# Patient Record
Sex: MALE | Race: BLACK OR AFRICAN AMERICAN | Employment: FULL TIME | URBAN - METROPOLITAN AREA
[De-identification: names, ages, dates, MRNs, and addresses within clinical notes are randomized per-mention and may not be internally consistent; named-entity substitution may affect disease eponyms.]

---

## 2018-02-20 ENCOUNTER — OFFICE VISIT (OUTPATIENT)
Dept: ENT CLINIC | Age: 58
End: 2018-02-20

## 2018-02-20 VITALS — SYSTOLIC BLOOD PRESSURE: 120 MMHG | DIASTOLIC BLOOD PRESSURE: 90 MMHG

## 2018-02-20 DIAGNOSIS — H90.3 SENSORINEURAL HEARING LOSS (SNHL) OF BOTH EARS: Primary | ICD-10-CM

## 2018-02-20 DIAGNOSIS — R43.9 DISORDERS OF SMELL: ICD-10-CM

## 2018-02-20 DIAGNOSIS — J32.9 RECURRENT SINUSITIS: ICD-10-CM

## 2018-02-20 DIAGNOSIS — J30.9 ALLERGIC SINUSITIS: ICD-10-CM

## 2018-02-20 PROCEDURE — 99203 OFFICE O/P NEW LOW 30 MIN: CPT | Performed by: OTOLARYNGOLOGY

## 2018-02-20 RX ORDER — FLUTICASONE PROPIONATE 50 MCG
2 SPRAY, SUSPENSION (ML) NASAL DAILY
Qty: 1 BOTTLE | Refills: 1 | Status: SHIPPED | OUTPATIENT
Start: 2018-02-20 | End: 2020-09-22

## 2018-02-20 RX ORDER — PREDNISONE 10 MG/1
TABLET ORAL
Qty: 25 TABLET | Refills: 0 | Status: SHIPPED | OUTPATIENT
Start: 2018-02-20 | End: 2020-09-22

## 2018-02-20 ASSESSMENT — ENCOUNTER SYMPTOMS
TROUBLE SWALLOWING: 0
EYES NEGATIVE: 1
VOICE CHANGE: 0
RESPIRATORY NEGATIVE: 1
ALLERGIC/IMMUNOLOGIC NEGATIVE: 1
SINUS PRESSURE: 1
SINUS PAIN: 1
SORE THROAT: 0
RHINORRHEA: 1
FACIAL SWELLING: 0

## 2018-02-20 NOTE — PROGRESS NOTES
negative for erythema indicative of infection. There was not evidence of deviation of the septum which was not significant. There were not polyps present. .There were not other masses present. The turbinates were not enlarged beyond normal.     Eyes: Conjunctivae are normal. Pupils are equal, round, and reactive to light. Neck: Normal range of motion. Neck supple. No tracheal deviation present. No thyromegaly present. Cardiovascular: Normal rate and regular rhythm. Pulmonary/Chest: Effort normal.   Lymphadenopathy:     He has no cervical adenopathy. Neurological: He is alert and oriented to person, place, and time. Skin: Skin is warm and dry. Psychiatric: He has a normal mood and affect. His behavior is normal. Judgment and thought content normal.       Assessment:      Concern is that it may well represent allergic sinusitis with eustachian tube dysfunction on the left side and sense of smell decrease. However, further analysis should be performed. Plan: We will obtain a sinus CT scan and try him on a course of prednisone as well as Flonase nasal spray.

## 2018-10-22 ENCOUNTER — HOSPITAL ENCOUNTER (EMERGENCY)
Age: 58
Discharge: HOME | End: 2018-10-22
Attending: EMERGENCY MEDICINE
Payer: OTHER MISCELLANEOUS

## 2018-10-22 VITALS
BODY MASS INDEX: 30.43 KG/M2 | OXYGEN SATURATION: 100 % | HEART RATE: 77 BPM | TEMPERATURE: 98.5 F | RESPIRATION RATE: 12 BRPM | WEIGHT: 229.6 LBS | HEIGHT: 73 IN | SYSTOLIC BLOOD PRESSURE: 160 MMHG | DIASTOLIC BLOOD PRESSURE: 101 MMHG

## 2018-10-22 DIAGNOSIS — I10 ELEVATED BLOOD PRESSURE READING WITH DIAGNOSIS OF HYPERTENSION: ICD-10-CM

## 2018-10-22 DIAGNOSIS — M54.31 SCIATICA OF RIGHT SIDE: Primary | ICD-10-CM

## 2018-10-22 PROCEDURE — 99283 EMERGENCY DEPT VISIT LOW MDM: CPT

## 2018-10-22 ASSESSMENT — PAIN SCALES - GENERAL
PAINLEVEL_OUTOF10: 4
PAINLEVEL_OUTOF10: 3

## 2018-10-22 ASSESSMENT — PAIN DESCRIPTION - LOCATION
LOCATION: BACK
LOCATION: LEG;BACK

## 2018-10-22 ASSESSMENT — PAIN DESCRIPTION - PAIN TYPE
TYPE: ACUTE PAIN
TYPE: ACUTE PAIN

## 2018-10-22 ASSESSMENT — PAIN DESCRIPTION - DESCRIPTORS
DESCRIPTORS: ACHING
DESCRIPTORS: ACHING

## 2018-10-22 ASSESSMENT — PAIN DESCRIPTION - ORIENTATION
ORIENTATION: RIGHT
ORIENTATION: RIGHT
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
## 2020-09-22 RX ORDER — DUPILUMAB 200 MG/1.14ML
200 INJECTION, SOLUTION SUBCUTANEOUS ONCE
COMMUNITY

## 2020-09-22 RX ORDER — AMLODIPINE BESYLATE 10 MG/1
10 TABLET ORAL DAILY
COMMUNITY

## 2020-09-22 NOTE — PROGRESS NOTES
[address] [facility] time__1230__________        Surgery time___1400_________    Take the following medications with a sip of water: Amlodipine     Do not eat or drink anything after 12:00 midnight prior to your surgery. This includes water chewing gum, mints and ice chips. You may brush your teeth and gargle the morning of your surgery, but do not swallow the water     Please see your family doctor/pediatrician for a history and physical and/or concerning medications. H&P 9/8 Juana Coulter    Bring any test results/reports from your physicians office. If you are under the care of a heart doctor or specialist doctor, please be aware that you may be asked to them for clearance    You may be asked to stop blood thinners such as Coumadin, Plavix, Fragmin, Lovenox, etc., or any anti-inflammatories such as:  Aspirin, Ibuprofen, Advil, Naproxen prior to your surgery. We also ask that you stop any OTC medications such as fish oil, vitamin E, glucosamine, garlic, Multivitamins, COQ 10, etc.    We ask that you do not smoke 24 hours prior to surgery  We ask that you do not  drink any alcoholic beverages 24 hours prior to surgery     You must make arrangements for a responsible adult to take you home after your surgery. For your safety you will not be allowed to leave alone or drive yourself home. Your surgery will be cancelled if you do not have a ride home. Also for your safety, it is strongly suggested that someone stay with you the first 24 hours after your surgery. A parent or legal guardian must accompany a child scheduled for surgery and plan to stay at the hospital until the child is discharged. Please do not bring other children with you. For your comfort, please wear simple loose fitting clothing to the hospital.  Please do not bring valuables. Wear short sleeve button down shirt or loose fitting shirt. Bting eye drops or ointment.     Do not wear any make-up or nail polish on your fingers or toes      For your safety, please do not wear any jewelry or body piercing's on the day of surgery. All jewelry must be removed. If you have dentures, they will be removed before going to operating room. For your convenience, we will provide you with a container. If you wear contact lenses or glasses, they will be removed, please bring a case for them. If you have a living will and a durable power of  for healthcare, please bring in a copy. As part of our patient safety program to minimize surgical site infections, we ask you to do the following:    · Please notify your surgeon if you develop any illness between         now and the  day of your surgery. · This includes a cough, cold, fever, sore throat, nausea,         or vomiting, and diarrhea, etc.  ·  Please notify your surgeon if you experience dizziness, shortness         of breath or blurred vision between now and the time of your surgery. Do not shave your operative site 96 hours prior to surgery. For face and neck surgery, men may use an electric razor 48 hours   prior to surgery. You may shower the night before surgery or the morning of   your surgery with an antibacterial soap. You will need to bring a photo ID and insurance card    [facility] has an onsite pharmacy, would you like to utilize our pharmacy     If you will be staying overnight and use a C-pap machine, please bring   your C-pap to hospital     Our goal is to provide you with excellent care, therefore, visitors will be limited to two(2) in the room at a time so that we may focus on providing this care for you. Please contact pre-admission testing if you have any further questions.                  [facility] phone number:  [phone]   number:  [phone]  Please note these are generalized instructions for all surgical cases, you may be provided with more specific instructions according to your surgery.

## 2020-09-22 NOTE — PROGRESS NOTES
Preoperative Screening for Elective Surgery/Invasive Procedures While COVID-19 present in the community     Have you tested positive or have been told to self-isolate for COVID-19 like symptoms within the past 28 days?  Do you currently have any of the following symptoms? o Fever >100.0 F or 99.9 F in immunocompromised patients? o New onset cough, shortness of breath or difficulty breathing?  o New onset sore throat, myalgia (muscle aches and pains), headache, loss of taste/smell or diarrhea?  Have you had a potential exposure to COVID-19 within the past 14 days by:  o Close contact with a confirmed case? o Close contact with a healthcare worker,  or essential infrastructure worker (grocery store, [employer], gas station, public utilities or transportation)? o Do you reside in a congregate setting such as; skilled nursing facility, adult home, correctional facility, homeless shelter or other institutional setting?  o Have you had recent travel to a known COVID-19 hotspot? No to all above     Indicate if the patient has a positive screen by answering yes to one or more of the above questions. Patients who test positive or screen positive prior to surgery or on the day of surgery should be evaluated in conjunction with the surgeon/proceduralist/anesthesiologist to determine the urgency of the procedure.

## 2020-09-25 ENCOUNTER — ANESTHESIA EVENT (OUTPATIENT)
Dept: SURGERY | Age: 60
End: 2020-09-25
Payer: COMMERCIAL

## 2020-09-25 NOTE — PROGRESS NOTES
[address] [facility]  [phone]        Pre-Op Phone Call:     Patient Name: Josep Rascon     Telephone Information:   Mobile [phone]     Home phone:  [phone]    Surgery Time:    2:00 PM     Arrival Time:  1230     Left extended Message:  NA     Message left with:     Recent change in health status:  No     Advised of transportation/ policy:  Yes     NPO policy reviewed:  Yes wife Steff Oakes to take morning heart/blood pressure medications with sips of water morning of surgery? Yes     Instructed to bring eye drops, photo identification, and insurance card day of surgery? Yes     Advised to wear short sleeved button down shirt (no T-shirt underneath):  Yes     Advised not to wear jewelry, hairpins, or pantyhose day of surgery? Yes     Advised not to wear make-up and to wash face day of surgery?   Yes    Remarks:        Electronically signed by:  Sandra Romero RN at 9/25/2020 11:30 AM

## 2020-09-28 ENCOUNTER — HOSPITAL ENCOUNTER (OUTPATIENT)
Age: 60
Setting detail: OUTPATIENT SURGERY
Discharge: HOME | End: 2020-09-28
Attending: OPHTHALMOLOGY | Admitting: OPHTHALMOLOGY
Payer: COMMERCIAL

## 2020-09-28 ENCOUNTER — ANESTHESIA (OUTPATIENT)
Dept: SURGERY | Age: 60
End: 2020-09-28
Payer: COMMERCIAL

## 2020-09-28 VITALS
TEMPERATURE: 97.1 F | RESPIRATION RATE: 16 BRPM | BODY MASS INDEX: 29.82 KG/M2 | OXYGEN SATURATION: 96 % | WEIGHT: 225 LBS | HEART RATE: 56 BPM | DIASTOLIC BLOOD PRESSURE: 85 MMHG | SYSTOLIC BLOOD PRESSURE: 145 MMHG | HEIGHT: 73 IN

## 2020-09-28 VITALS — DIASTOLIC BLOOD PRESSURE: 87 MMHG | SYSTOLIC BLOOD PRESSURE: 117 MMHG | OXYGEN SATURATION: 98 %

## 2020-09-28 PROCEDURE — 2580000003 HC RX 258: Performed by: ANESTHESIOLOGY

## 2020-09-28 PROCEDURE — 3700000000 HC ANESTHESIA ATTENDED CARE: Performed by: OPHTHALMOLOGY

## 2020-09-28 PROCEDURE — 3600000002 HC SURGERY LEVEL 2 BASE: Performed by: OPHTHALMOLOGY

## 2020-09-28 PROCEDURE — 2500000003 HC RX 250 WO HCPCS: Performed by: OPHTHALMOLOGY

## 2020-09-28 PROCEDURE — 7100000011 HC PHASE II RECOVERY - ADDTL 15 MIN: Performed by: OPHTHALMOLOGY

## 2020-09-28 PROCEDURE — 6360000002 HC RX W HCPCS: Performed by: OPHTHALMOLOGY

## 2020-09-28 PROCEDURE — 6360000002 HC RX W HCPCS: Performed by: NURSE ANESTHETIST, CERTIFIED REGISTERED

## 2020-09-28 PROCEDURE — L8612 AQUEOUS SHUNT PROSTHESIS: HCPCS | Performed by: OPHTHALMOLOGY

## 2020-09-28 PROCEDURE — 7100000010 HC PHASE II RECOVERY - FIRST 15 MIN: Performed by: OPHTHALMOLOGY

## 2020-09-28 PROCEDURE — 6370000000 HC RX 637 (ALT 250 FOR IP): Performed by: OPHTHALMOLOGY

## 2020-09-28 PROCEDURE — 3700000001 HC ADD 15 MINUTES (ANESTHESIA): Performed by: OPHTHALMOLOGY

## 2020-09-28 PROCEDURE — 2709999900 HC NON-CHARGEABLE SUPPLY: Performed by: OPHTHALMOLOGY

## 2020-09-28 PROCEDURE — 3600000012 HC SURGERY LEVEL 2 ADDTL 15MIN: Performed by: OPHTHALMOLOGY

## 2020-09-28 DEVICE — SYSTEM GLAUCOMA TREAT INCLUDESXEN 45 PORCINE DERM GEL STENT: Type: IMPLANTABLE DEVICE | Site: EYE | Status: FUNCTIONAL

## 2020-09-28 RX ORDER — BALANCED SALT SOLUTION 6.4; .75; .48; .3; 3.9; 1.7 MG/ML; MG/ML; MG/ML; MG/ML; MG/ML; MG/ML
SOLUTION OPHTHALMIC
Status: COMPLETED | OUTPATIENT
Start: 2020-09-28 | End: 2020-09-28

## 2020-09-28 RX ORDER — MIDAZOLAM HYDROCHLORIDE 1 MG/ML
INJECTION INTRAMUSCULAR; INTRAVENOUS PRN
Status: DISCONTINUED | OUTPATIENT
Start: 2020-09-28 | End: 2020-09-28 | Stop reason: SDUPTHER

## 2020-09-28 RX ORDER — SODIUM CHLORIDE 0.9 % (FLUSH) 0.9 %
10 SYRINGE (ML) INJECTION PRN
Status: DISCONTINUED | OUTPATIENT
Start: 2020-09-28 | End: 2020-09-28 | Stop reason: HOSPADM

## 2020-09-28 RX ORDER — FENTANYL CITRATE 50 UG/ML
INJECTION, SOLUTION INTRAMUSCULAR; INTRAVENOUS PRN
Status: DISCONTINUED | OUTPATIENT
Start: 2020-09-28 | End: 2020-09-28 | Stop reason: SDUPTHER

## 2020-09-28 RX ORDER — TRIAMCINOLONE ACETONIDE 40 MG/ML
INJECTION, SUSPENSION INTRA-ARTICULAR; INTRAMUSCULAR
Status: COMPLETED | OUTPATIENT
Start: 2020-09-28 | End: 2020-09-28

## 2020-09-28 RX ORDER — CIPROFLOXACIN HYDROCHLORIDE 3.5 MG/ML
1 SOLUTION/ DROPS TOPICAL SEE ADMIN INSTRUCTIONS
Status: COMPLETED | OUTPATIENT
Start: 2020-09-28 | End: 2020-09-28

## 2020-09-28 RX ORDER — NEOMYCIN SULFATE, POLYMYXIN B SULFATE, AND DEXAMETHASONE 3.5; 10000; 1 MG/G; [USP'U]/G; MG/G
OINTMENT OPHTHALMIC
Status: COMPLETED | OUTPATIENT
Start: 2020-09-28 | End: 2020-09-28

## 2020-09-28 RX ORDER — SODIUM CHLORIDE 9 MG/ML
INJECTION, SOLUTION INTRAVENOUS CONTINUOUS
Status: DISCONTINUED | OUTPATIENT
Start: 2020-09-28 | End: 2020-09-28 | Stop reason: HOSPADM

## 2020-09-28 RX ORDER — SODIUM CHLORIDE 0.9 % (FLUSH) 0.9 %
10 SYRINGE (ML) INJECTION PRN
Status: DISCONTINUED | OUTPATIENT
Start: 2020-09-28 | End: 2020-09-28 | Stop reason: SDUPTHER

## 2020-09-28 RX ORDER — SODIUM CHLORIDE 0.9 % (FLUSH) 0.9 %
10 SYRINGE (ML) INJECTION EVERY 12 HOURS SCHEDULED
Status: DISCONTINUED | OUTPATIENT
Start: 2020-09-28 | End: 2020-09-28 | Stop reason: HOSPADM

## 2020-09-28 RX ORDER — SODIUM CHLORIDE 0.9 % (FLUSH) 0.9 %
10 SYRINGE (ML) INJECTION EVERY 12 HOURS SCHEDULED
Status: DISCONTINUED | OUTPATIENT
Start: 2020-09-28 | End: 2020-09-28 | Stop reason: SDUPTHER

## 2020-09-28 RX ORDER — TETRACAINE HYDROCHLORIDE 5 MG/ML
SOLUTION OPHTHALMIC
Status: COMPLETED | OUTPATIENT
Start: 2020-09-28 | End: 2020-09-28

## 2020-09-28 RX ORDER — PHENYLEPHRINE HCL 2.5 %
1 DROPS OPHTHALMIC (EYE) ONCE
Status: COMPLETED | OUTPATIENT
Start: 2020-09-28 | End: 2020-09-28

## 2020-09-28 RX ADMIN — CIPROFLOXACIN 1 DROP: 3 SOLUTION OPHTHALMIC at 13:12

## 2020-09-28 RX ADMIN — PHENYLEPHRINE HYDROCHLORIDE 1 DROP: 25 SOLUTION/ DROPS OPHTHALMIC at 13:12

## 2020-09-28 RX ADMIN — SODIUM CHLORIDE: 9 INJECTION, SOLUTION INTRAVENOUS at 13:08

## 2020-09-28 RX ADMIN — FENTANYL CITRATE 50 MCG: 50 INJECTION INTRAMUSCULAR; INTRAVENOUS at 13:31

## 2020-09-28 RX ADMIN — SODIUM CHLORIDE: 9 INJECTION, SOLUTION INTRAVENOUS at 13:28

## 2020-09-28 RX ADMIN — POVIDONE-IODINE 0.1 ML: 5 SOLUTION OPHTHALMIC at 13:05

## 2020-09-28 RX ADMIN — CIPROFLOXACIN 1 DROP: 3 SOLUTION OPHTHALMIC at 13:05

## 2020-09-28 RX ADMIN — MIDAZOLAM 2 MG: 1 INJECTION INTRAMUSCULAR; INTRAVENOUS at 13:30

## 2020-09-28 RX ADMIN — FENTANYL CITRATE 50 MCG: 50 INJECTION INTRAMUSCULAR; INTRAVENOUS at 13:30

## 2020-09-28 ASSESSMENT — PAIN SCALES - GENERAL
PAINLEVEL_OUTOF10: 0

## 2020-09-28 ASSESSMENT — LIFESTYLE VARIABLES: SMOKING_STATUS: 0

## 2020-09-28 NOTE — ANESTHESIA POSTPROCEDURE EVALUATION
Department of Anesthesiology  Postprocedure Note    Patient: Yahaira Moffett  MRN: [id number]  YOB: 1960  Date of evaluation: 9/28/2020  Time:  2:38 PM     Procedure Summary     Date:  09/28/20 Room / Location:  [address]    Anesthesia Start:  8763 Anesthesia Stop:  8774    Procedure:  XEN GEL IMPLANT (Left Eye) Diagnosis:       Primary open angle glaucoma of left eye, severe stage      (Primary open angle glaucoma of left eye, severe)    Surgeon:  Surya Randle MD Responsible Provider:  Anastacia Manley MD    Anesthesia Type:  MAC ASA Status:  2          Anesthesia Type: MAC    Kimberly Phase I: Kimberly Score: 10    Kimberly Phase II: Kimberly Score: 9    Last vitals: Reviewed and per EMR flowsheets.        Anesthesia Post Evaluation    Patient location during evaluation: PACU  Patient participation: complete - patient participated  Level of consciousness: awake and alert  Pain score: 0  Airway patency: patent  Nausea & Vomiting: no nausea and no vomiting  Complications: no  Cardiovascular status: blood pressure returned to baseline  Respiratory status: acceptable  Hydration status: euvolemic

## 2020-09-28 NOTE — ANESTHESIA PRE PROCEDURE
[facility] Department of Anesthesiology  Pre-Anesthesia Evaluation/Consultation       Name:  Kimberli Ramirez  : 1960  Age:  64 y.o. MRN:  [id number]  Date: 2020           Surgeon: Surgeon(s):  Letha Marie MD    Procedure: Procedure(s):  XEN GEL IMPLANT     Allergies   Allergen Reactions    Doxepin Other (See Comments)     Pt is very sensitive, to this medication, sleeps for days     Patient Active Problem List   Diagnosis    Sensorineural hearing loss (SNHL) of both ears    Disorders of smell    Allergic sinusitis     Past Medical History:   Diagnosis Date    Chronic glaucoma     Eczema     Hypertension     Psoriasis      Past Surgical History:   Procedure Laterality Date    BACK SURGERY      COLONOSCOPY       Social History     Tobacco Use    Smoking status: Never Smoker    Smokeless tobacco: Never Used   Substance Use Topics    Alcohol use: Yes    Drug use: Yes     Types: Marijuana     Comment: medical for glaucoma      Medications  No current facility-administered medications on file prior to encounter. Current Outpatient Medications on File Prior to Encounter   Medication Sig Dispense Refill    amLODIPine (NORVASC) 10 MG tablet Take 10 mg by mouth daily      Dupilumab (DUPIXENT) 200 MG/1. 14ML SOSY injection Inject 200 mg into the skin once       Current Facility-Administered Medications   Medication Dose Route Frequency Provider Last Rate Last Dose    povidone-iodine 5 % ophthalmic solution 0.1 mL  1 drop Ophthalmic Once Letha Marie MD        ciprofloxacin (CILOXAN) 0.3 % ophthalmic solution 1 drop  1 drop Ophthalmic See Admin Instructions Letha Marie MD        phenylephrine (MYDFRIN) 2.5 % ophthalmic solution 1 drop  1 drop Left Eye Once Letha Marie MD        0.9 % sodium chloride infusion   Intravenous Continuous Kelsey Klein MD        sodium chloride flush 0.9 % injection 10 mL  10 mL Intravenous 2 times per day Tierney Augustine MD        sodium chloride flush 0.9 % injection 10 mL  10 mL Intravenous PRN Tierney Augustine MD         Vital Signs (Current)   Vitals:    20 1059 20 1300   BP:  (!) 137/92   Pulse:  66   Resp:  21   Temp:  96.9 °F (36.1 °C)   TempSrc:  Temporal   SpO2:  98%   Weight: 225 lb (102.1 kg)    Height: 6' 1\" (1.854 m)                                           BP Readings from Last 3 Encounters:   20 (!) 137/92   10/22/18 (!) 160/101   18 (!) 120/90     Vital Signs Statistics (for past 48 hrs)     Temp  Av.9 °F (36.1 °C)  Min: 96.9 °F (36.1 °C)   Min taken time: 20 1300  Max: 96.9 °F (36.1 °C)   Max taken time: 20 1300  Pulse  Av  Min: 66   Min taken time: 20 1300  Max: 66   Max taken time: 20 1300  Resp  Av  Min: 21   Min taken time: 20 1300  Max: 21   Max taken time: 20 1300  BP  Min: 137/92   Min taken time: 20 1300  Max: 137/92   Max taken time: 20 1300  SpO2  Av %  Min: 98 %   Min taken time: 20 1300  Max: 98 %   Max taken time: 20 1300  BP Readings from Last 3 Encounters:   20 (!) 137/92   10/22/18 (!) 160/101   18 (!) 120/90       BMI  Body mass index is 29.69 kg/m². Estimated body mass index is 29.69 kg/m² as calculated from the following:    Height as of this encounter: 6' 1\" (1.854 m). Weight as of this encounter: 225 lb (102.1 kg). CBC No results found for: WBC, RBC, HGB, HCT, MCV, RDW, PLT  CMP  No results found for: NA, K, CL, CO2, BUN, CREATININE, GFRAA, AGRATIO, LABGLOM, GLUCOSE, PROT, CALCIUM, BILITOT, ALKPHOS, AST, ALT  BMP  No results found for: NA, K, CL, CO2, BUN, CREATININE, CALCIUM, GFRAA, LABGLOM, GLUCOSE  POCGlucose  No results for input(s): GLUCOSE in the last 72 hours.    Coags  No results found for: PROTIME, INR, APTT  HCG (If Applicable) No results found for: PREGTESTUR, PREGSERUM, HCG, HCGQUANT   ABGs No results found for: PHART, PO2ART, RFI9PLG, IWI2HWD, BEART, H9VCXLCS   Type & Screen (If Applicable)  No results found for: LABABO, LABRH                         BMI: Wt Readings from Last 3 Encounters:       NPO Status:  >8h                          Anesthesia Evaluation  Patient summary reviewed no history of anesthetic complications:   Airway: Mallampati: II  TM distance: >3 FB   Neck ROM: full  Mouth opening: > = 3 FB Dental: normal exam         Pulmonary: breath sounds clear to auscultation      (-) COPD, asthma, sleep apnea and not a current smoker                           Cardiovascular:  Exercise tolerance: good (>4 METS),   (+) hypertension:,     (-) past MI and  angina      Rhythm: regular  Rate: normal                    Neuro/Psych:      (-) seizures, TIA and CVA           GI/Hepatic/Renal:        (-) GERD       Endo/Other:        (-) diabetes mellitus, hypothyroidism               Abdominal:           Vascular:     - DVT and PE. Anesthesia Plan      MAC     ASA 2       Induction: intravenous. Anesthetic plan and risks discussed with patient. Plan discussed with CRNA. This pre-anesthesia assessment may be used as a history and physical.    DOS STAFF ADDENDUM:    Pt seen and examined, chart reviewed (including anesthesia, drug and allergy history). No interval changes to history and physical examination. Anesthetic plan, risks, benefits, alternatives, and personnel involved discussed with patient. Patient verbalized an understanding and agrees to proceed.       Asha Hernandez MD  September 28, 2020  1:06 PM

## 2020-09-28 NOTE — PROGRESS NOTES
D/C INSTRUCTIONS REVIEWED WITH PT AND FAMILY AT THE BEDSIDE. TOLERATING PO FLUIDS. ALL QUESTIONS ANSWERED.  Samantha Ledesma

## 2020-09-28 NOTE — OP NOTE
operative eye. About 0.1ml of a 1:1 solution of lidocaine/bupivicaine with hyaluronidase was injected subconjunctivally at the limbus. A 6-0 silk traction suture was passed in the superior cornea and the eye was rotated downward. About 0.1ml of 0.4mg/ml mitomycin-c the SubTenon's space. Care was taken to avoid limbal exposure and pressure was held to avoid bleeding. The surface was copiously flushed. A small peritomy and relaxing incision was created superiorly. Subconjunctival dissection was carried out, avoiding Tenon's. Additional block was injected on a blunt cannula. Minimal cautery was needed on the sclera. The XEN injector was used to implant the XEN 2mm posterior to the limbus. Indirect gonioscopy revealed good placement in the anterior chamber. It was adjusted to have 1mm intracameral and 3mm external. Cefuroxime was injected into the bleb. A small amount of triamcinolone was injected into Tenon's capsule superior. The conjunctiva was closed over the implant, keeping it subconjunctival and above Tenon's. It was closed with an 8-0 Vicryl on a BV needle at the relaxing incision. The implant was mobile. A bleb was forming. The closure was found to be watertight. The traction suture was removed. The speculum was removed under direct visualization of the microscope. The face was cleaned and dried. Generic Maxitrol ointment was placed in the surface of the eye. The eye was patched and shielded and the patient was wheeled to the postoperative area in good condition. ?    COMPLICATIONS: None  SPECIMENS REMOVED: None. ESTIMATED BLOOD LOSS: none  INTRAOPERATIVE FLUIDS: Please see anesthesia record. SPONGE/INSTRUMENT/NEEDLE COUNTS: Correct at the end of the case. CONDITION ON DISCHARGE FROM OPERATING ROOM: Stable.         Electronically signed by Clark Larsen MD on 9/28/2020 at 1:57 PM

## 2020-09-28 NOTE — H&P
I have seen, marked, and examined the patient. There are no significant changes to the medical history or perioperative risk assessment. Proceed with XEN gel glaucoma stent left eye under Monitored anesthetic care. The patient is ready for the OR.

## 2022-05-17 NOTE — PROGRESS NOTES
[address] [facility] time__1100__________        Surgery time___1230_________    Take the following medications with a sip of water: Follow your MD/Surgeons pre-procedure instructions regarding your medications     Do not eat or drink anything after 12:00 midnight prior to your surgery. Clear liquids until 0800  This includes water chewing gum, mints and ice chips. You may brush your teeth and gargle the morning of your surgery, but do not swallow the water     Please see your family doctor/pediatrician for a history and physical and/or concerning medications. H&P Martine Jackman 5/16/22   Bring any test results/reports from your physicians office. If you are under the care of a heart doctor or specialist doctor, please be aware that you may be asked to them for clearance    You may be asked to stop blood thinners such as Coumadin, Plavix, Fragmin, Lovenox, etc., or any anti-inflammatories such as:  Aspirin, Ibuprofen, Advil, Naproxen prior to your surgery. We also ask that you stop any OTC medications such as fish oil, vitamin E, glucosamine, garlic, Multivitamins, COQ 10, etc.    We ask that you do not smoke 24 hours prior to surgery  We ask that you do not  drink any alcoholic beverages 24 hours prior to surgery     You must make arrangements for a responsible adult to take you home after your surgery. For your safety you will not be allowed to leave alone or drive yourself home. Your surgery will be cancelled if you do not have a ride home. Also for your safety, it is strongly suggested that someone stay with you the first 24 hours after your surgery. A parent or legal guardian must accompany a child scheduled for surgery and plan to stay at the hospital until the child is discharged. Please do not bring other children with you. For your comfort, please wear simple loose fitting clothing to the hospital.  Please do not bring valuables.  Wear short sleeve button down shirt or loose shirt and bring eye drops or eye ointment. Do not wear any make-up or nail polish on your fingers or toes      For your safety, please do not wear any jewelry or body piercing's on the day of surgery. All jewelry must be removed. If you have dentures, they will be removed before going to operating room. For your convenience, we will provide you with a container. If you wear contact lenses or glasses, they will be removed, please bring a case for them. If you have a living will and a durable power of  for healthcare, please bring in a copy. As part of our patient safety program to minimize surgical site infections, we ask you to do the following:    · Please notify your surgeon if you develop any illness between         now and the  day of your surgery. · This includes a cough, cold, fever, sore throat, nausea,         or vomiting, and diarrhea, etc.  ·  Please notify your surgeon if you experience dizziness, shortness         of breath or blurred vision between now and the time of your surgery. Do not shave your operative site 96 hours prior to surgery. For face and neck surgery, men may use an electric razor 48 hours   prior to surgery. You may shower the night before surgery or the morning of   your surgery with an antibacterial soap. You will need to bring a photo ID and insurance card    [facility] has an onsite pharmacy, would you like to utilize our pharmacy     If you will be staying overnight and use a C-pap machine, please bring   your C-pap to hospital     Our goal is to provide you with excellent care, therefore, visitors will be limited to two(2) in the room at a time so that we may focus on providing this care for you. Please contact pre-admission testing if you have any further questions.                  [facility] phone number:  [phone]  Please note these are generalized instructions for all surgical cases, you may be provided with more specific instructions according to your surgery. C-Difficile admission screening and protocol:       * Admitted with diarrhea? [] YES    [x]  NO     *Prior history of C-Diff. In last 3 months? [] YES    [x]  NO     *Antibiotic use in the past 6-8 weeks? [x]  NO    []  YES                 If yes, which ANTIBIOTIC AND REASON______     *Prior hospitalization or nursing home in the last month? []  YES    [x]  NO        SAFETY FIRST. .call before you fall

## 2022-05-20 ENCOUNTER — ANESTHESIA EVENT (OUTPATIENT)
Dept: SURGERY | Age: 62
End: 2022-05-20
Payer: COMMERCIAL

## 2022-05-20 NOTE — PRE-PROCEDURE INSTRUCTIONS
[address] [facility]  [phone]        Pre-Op Phone Call:     Patient Name: Kathlene Gaucher     Telephone Information:   Mobile [phone]     Home phone:  [phone]    Surgery Time:   12:30 PM     Arrival Time:  1100     Left extended Message:  NO   Message left with: Spoke with wife     Recent change in health status:  NO     Advised of transportation/ policy:  YES     NPO policy reviewed:  YES     Advised to take morning heart/blood pressure medications with sips of water morning of surgery? YES  Instructed to bring eye drops, photo identification, and insurance card day of surgery? YES  Advised to wear short sleeved button down shirt (no T-shirt underneath):  YES     Advised not to wear jewelry, hairpins, or pantyhose day of surgery? YES  Advised not to wear make-up and to wash face day of surgery?   YES    Remarks:        Electronically signed by:  Gogo Younger RN at 5/20/2022 12:29 PM

## 2022-05-23 ENCOUNTER — ANESTHESIA (OUTPATIENT)
Dept: SURGERY | Age: 62
End: 2022-05-23
Payer: COMMERCIAL

## 2022-05-23 ENCOUNTER — HOSPITAL ENCOUNTER (OUTPATIENT)
Age: 62
Setting detail: OUTPATIENT SURGERY
Discharge: HOME | End: 2022-05-23
Attending: OPHTHALMOLOGY | Admitting: OPHTHALMOLOGY
Payer: COMMERCIAL

## 2022-05-23 VITALS
HEIGHT: 73 IN | BODY MASS INDEX: 31.81 KG/M2 | HEART RATE: 70 BPM | WEIGHT: 240 LBS | DIASTOLIC BLOOD PRESSURE: 89 MMHG | OXYGEN SATURATION: 97 % | RESPIRATION RATE: 18 BRPM | TEMPERATURE: 97.5 F | SYSTOLIC BLOOD PRESSURE: 133 MMHG

## 2022-05-23 PROCEDURE — 2709999900 HC NON-CHARGEABLE SUPPLY: Performed by: OPHTHALMOLOGY

## 2022-05-23 PROCEDURE — 3700000001 HC ADD 15 MINUTES (ANESTHESIA): Performed by: OPHTHALMOLOGY

## 2022-05-23 PROCEDURE — 6370000000 HC RX 637 (ALT 250 FOR IP): Performed by: OPHTHALMOLOGY

## 2022-05-23 PROCEDURE — 3600000014 HC SURGERY LEVEL 4 ADDTL 15MIN: Performed by: OPHTHALMOLOGY

## 2022-05-23 PROCEDURE — 6360000002 HC RX W HCPCS

## 2022-05-23 PROCEDURE — 6360000002 HC RX W HCPCS: Performed by: OPHTHALMOLOGY

## 2022-05-23 PROCEDURE — 7100000010 HC PHASE II RECOVERY - FIRST 15 MIN: Performed by: OPHTHALMOLOGY

## 2022-05-23 PROCEDURE — 3600000004 HC SURGERY LEVEL 4 BASE: Performed by: OPHTHALMOLOGY

## 2022-05-23 PROCEDURE — 6360000002 HC RX W HCPCS: Performed by: NURSE ANESTHETIST, CERTIFIED REGISTERED

## 2022-05-23 PROCEDURE — L8612 AQUEOUS SHUNT PROSTHESIS: HCPCS | Performed by: OPHTHALMOLOGY

## 2022-05-23 PROCEDURE — 3700000000 HC ANESTHESIA ATTENDED CARE: Performed by: OPHTHALMOLOGY

## 2022-05-23 PROCEDURE — 7100000011 HC PHASE II RECOVERY - ADDTL 15 MIN: Performed by: OPHTHALMOLOGY

## 2022-05-23 PROCEDURE — 2500000003 HC RX 250 WO HCPCS: Performed by: OPHTHALMOLOGY

## 2022-05-23 DEVICE — SYSTEM GLAUCOMA TREAT INCLUDESXEN 45 PORCINE DERM GEL STENT: Type: IMPLANTABLE DEVICE | Site: EYE | Status: FUNCTIONAL

## 2022-05-23 RX ORDER — POVIDONE-IODINE 10 MG/G
OINTMENT TOPICAL PRN
Status: CANCELLED | OUTPATIENT
Start: 2022-05-23

## 2022-05-23 RX ORDER — TRIAMCINOLONE ACETONIDE 40 MG/ML
INJECTION, SUSPENSION INTRA-ARTICULAR; INTRAMUSCULAR
Status: COMPLETED | OUTPATIENT
Start: 2022-05-23 | End: 2022-05-23

## 2022-05-23 RX ORDER — SODIUM CHLORIDE 0.9 % (FLUSH) 0.9 %
5-40 SYRINGE (ML) INJECTION PRN
Status: DISCONTINUED | OUTPATIENT
Start: 2022-05-23 | End: 2022-05-23 | Stop reason: HOSPADM

## 2022-05-23 RX ORDER — NEOMYCIN SULFATE, POLYMYXIN B SULFATE, AND DEXAMETHASONE 3.5; 10000; 1 MG/G; [USP'U]/G; MG/G
OINTMENT OPHTHALMIC
Status: COMPLETED | OUTPATIENT
Start: 2022-05-23 | End: 2022-05-23

## 2022-05-23 RX ORDER — FENTANYL CITRATE 50 UG/ML
INJECTION, SOLUTION INTRAMUSCULAR; INTRAVENOUS PRN
Status: DISCONTINUED | OUTPATIENT
Start: 2022-05-23 | End: 2022-05-23 | Stop reason: SDUPTHER

## 2022-05-23 RX ORDER — BALANCED SALT SOLUTION 6.4; .75; .48; .3; 3.9; 1.7 MG/ML; MG/ML; MG/ML; MG/ML; MG/ML; MG/ML
SOLUTION OPHTHALMIC
Status: COMPLETED | OUTPATIENT
Start: 2022-05-23 | End: 2022-05-23

## 2022-05-23 RX ORDER — MIDAZOLAM HYDROCHLORIDE 1 MG/ML
INJECTION INTRAMUSCULAR; INTRAVENOUS PRN
Status: DISCONTINUED | OUTPATIENT
Start: 2022-05-23 | End: 2022-05-23 | Stop reason: SDUPTHER

## 2022-05-23 RX ORDER — PHENYLEPHRINE HCL 2.5 %
1 DROPS OPHTHALMIC (EYE) ONCE
Status: COMPLETED | OUTPATIENT
Start: 2022-05-23 | End: 2022-05-23

## 2022-05-23 RX ORDER — SODIUM CHLORIDE 0.9 % (FLUSH) 0.9 %
5-40 SYRINGE (ML) INJECTION EVERY 12 HOURS SCHEDULED
Status: DISCONTINUED | OUTPATIENT
Start: 2022-05-23 | End: 2022-05-23 | Stop reason: HOSPADM

## 2022-05-23 RX ORDER — TETRACAINE HYDROCHLORIDE 5 MG/ML
SOLUTION OPHTHALMIC
Status: COMPLETED | OUTPATIENT
Start: 2022-05-23 | End: 2022-05-23

## 2022-05-23 RX ORDER — SODIUM CHLORIDE 9 MG/ML
INJECTION, SOLUTION INTRAVENOUS PRN
Status: DISCONTINUED | OUTPATIENT
Start: 2022-05-23 | End: 2022-05-23 | Stop reason: HOSPADM

## 2022-05-23 RX ORDER — TETRACAINE HYDROCHLORIDE 5 MG/ML
1 SOLUTION OPHTHALMIC ONCE
Status: COMPLETED | OUTPATIENT
Start: 2022-05-23 | End: 2022-05-23

## 2022-05-23 RX ADMIN — PHENYLEPHRINE HYDROCHLORIDE 1 DROP: 25 SOLUTION/ DROPS OPHTHALMIC at 12:15

## 2022-05-23 RX ADMIN — FENTANYL CITRATE 50 MCG: 50 INJECTION INTRAMUSCULAR; INTRAVENOUS at 12:37

## 2022-05-23 RX ADMIN — TETRACAINE HYDROCHLORIDE 1 DROP: 5 SOLUTION OPHTHALMIC at 12:11

## 2022-05-23 RX ADMIN — MIDAZOLAM 2 MG: 1 INJECTION INTRAMUSCULAR; INTRAVENOUS at 12:34

## 2022-05-23 ASSESSMENT — PAIN SCALES - GENERAL
PAINLEVEL_OUTOF10: 0
PAINLEVEL_OUTOF10: 0

## 2022-05-23 ASSESSMENT — PAIN - FUNCTIONAL ASSESSMENT: PAIN_FUNCTIONAL_ASSESSMENT: 0-10

## 2022-05-23 NOTE — OP NOTE
Operative Note      Patient: Anette Forbes  YOB: 1960  MRN: [id number]    Date of Procedure: 5/23/2022      Implants:  Implant Name Type Inv. Item Serial No.  Lot No. LRB No. Used Action   SYSTEM GLAUCOMA TREAT INCLUDESXEN 45 PORCINE DERM GEL STENT - [id number]  SYSTEM GLAUCOMA TREAT INCLUDESXEN 39 PORCINE DERM GEL STENT [id number]  Right 1 Implanted         PRE - OPERATIVE DIAGNOSES:     Primary open angle glaucoma, right eye  POST - OPERATIVE DIAGNOSES:    Same     ATTENDING SURGEON:       Fermin Coronado M.D.     PROCEDURE:            Implantation of XEN gel glaucoma stent, right eye     ANESTHESIA:            Monitored anesthetic care with Topical and SubTenons anesthetic     INDICATIONS:  The patient had glaucoma that was insufficiently controlled on medical therapy. The indications, benefits, alternatives and risks including but not limited to vision loss, blindness, pain, bleeding, infection, hypotony, retinal detachment, the need for other procedures in the future were all discussed with the patient and the patient did wish to proceed with surgery.      DESCRIPTION OF THE PROCEDURE:     The patient was taken to the operating room and appropriately connected to cardiac, blood pressure and pulse oximetry monitors. A verbal time out occurred to verify the patient's name, date of birth and surgical site and procedure to be performed. Tetracaine was instilled in the eye. The operative eye was prepped and draped in the usual sterile ophthalmic fashion. A lid speculum was placed. The operative microscope was brought into view over the operative eye. A 6-0 silk traction suture was passed in the superior cornea and the eye was rotated downward. About 0.1ml of a 1:1 solution of lidocaine/bupivicaine with hyaluronidase was injected subconjunctivally in the intended location of the relaxing incision. About 0.1ml of 0.4mg/ml mitomycin-c the SubTenon's space.  Care was taken to avoid limbal exposure and pressure was held to avoid bleeding. The surface was copiously flushed. A corneal light shield was placed. A small peritomy and relaxing incision was created superiorly. Subconjunctival dissection was carried out, avoiding Tenon's. Minimal cautery was needed on the sclera. Indirect gonioscopy was used to assess the iridocorneal angle anatomy. The XEN injector was used to implant the XEN 2mm posterior to the limbus. Indirect gonioscopy revealed good placement in the anterior chamber. It was adjusted to have 1mm intracameral and 3mm external. Good flow from the lumen was verified with elevation and a Wekcel. The conjunctiva was closed over the implant with an 8-0 Vicryl on a BV needle at the relaxing incision. The implant was mobile and it was directed away from the closure site. A bleb was forming. The closure was found to be watertight. Cefuroxime and about 2mg Kenalog was injected adjacent to the bleb superonasally. The traction suture was removed. The speculum was removed under direct visualization of the microscope. The face was cleaned and dried. Generic Maxitrol ointment was placed in the surface of the eye. The eye was patched and shielded and the patient was wheeled to the postoperative area in good condition. ?     COMPLICATIONS: None  SPECIMENS REMOVED: None. ESTIMATED BLOOD LOSS: none  INTRAOPERATIVE FLUIDS: Please see anesthesia record. SPONGE/INSTRUMENT/NEEDLE COUNTS: Correct at the end of the case. CONDITION ON DISCHARGE FROM OPERATING ROOM: Stable.   Electronically signed by Ad Carter MD on 5/23/2022 at 1:11 PM

## 2022-05-23 NOTE — OP NOTE
PRE - OPERATIVE DIAGNOSES:  Primary open angle glaucoma, right eye  POST - OPERATIVE DIAGNOSES:    Same    ATTENDING SURGEON:   Chel Mixon M.D. PROCEDURE:  Implantation of XEN gel glaucoma stent, right eye    ANESTHESIA: Monitored anesthetic care with Topical and SubTenons anesthetic    INDICATIONS:  The patient had glaucoma that was insufficiently controlled on medical therapy. The indications, benefits, alternatives and risks including but not limited to vision loss, blindness, pain, bleeding, infection, hypotony, retinal detachment, the need for other procedures in the future were all discussed with the patient and the patient did wish to proceed with surgery. DESCRIPTION OF THE PROCEDURE:    The patient was taken to the operating room and appropriately connected to cardiac, blood pressure and pulse oximetry monitors. A verbal time out occurred to verify the patient's name, date of birth and surgical site and procedure to be performed. Tetracaine was instilled in the eye. The operative eye was prepped and draped in the usual sterile ophthalmic fashion. A lid speculum was placed. The operative microscope was brought into view over the operative eye. A 6-0 silk traction suture was passed in the superior cornea and the eye was rotated downward. About 0.1ml of a 1:1 solution of lidocaine/bupivicaine with hyaluronidase was injected subconjunctivally in the intended location of the relaxing incision. About 0.1ml of 0.4mg/ml mitomycin-c the SubTenon's space. Care was taken to avoid limbal exposure and pressure was held to avoid bleeding. The surface was copiously flushed. A corneal light shield was placed. A small peritomy and relaxing incision was created superiorly. Subconjunctival dissection was carried out, avoiding Tenon's. Minimal cautery was needed on the sclera. Indirect gonioscopy was used to assess the iridocorneal angle anatomy.   The XEN injector was used to implant the XEN 2mm posterior to the limbus. Indirect gonioscopy revealed good placement in the anterior chamber. It was adjusted to have 1mm intracameral and 3mm external. Good flow from the lumen was verified with elevation and a Wekcel. The conjunctiva was closed over the implant with an 8-0 Vicryl on a BV needle at the relaxing incision. The implant was mobile and it was directed away from the closure site. A bleb was forming. The closure was found to be watertight. Cefuroxime and about 2mg Kenalog was injected adjacent to the bleb superonasally. The traction suture was removed. The speculum was removed under direct visualization of the microscope. The face was cleaned and dried. Generic Maxitrol ointment was placed in the surface of the eye. The eye was patched and shielded and the patient was wheeled to the postoperative area in good condition. ?    COMPLICATIONS: None  SPECIMENS REMOVED: None. ESTIMATED BLOOD LOSS: none  INTRAOPERATIVE FLUIDS: Please see anesthesia record. SPONGE/INSTRUMENT/NEEDLE COUNTS: Correct at the end of the case. CONDITION ON DISCHARGE FROM OPERATING ROOM: Stable.

## 2022-05-23 NOTE — H&P
I have seen, marked, and examined the patient. There are no significant changes to the medical history or perioperative risk assessment. Proceed with XEN gel stent right eye under Monitored anesthetic care. The patient is ready for the OR.

## 2022-05-23 NOTE — ANESTHESIA PRE PROCEDURE
[facility] Department of Anesthesiology  Pre-Anesthesia Evaluation/Consultation       Name:  Elly De León  : 1960  Age:  63 y.o. MRN:  [id number]  Date: 2022           Surgeon: Surgeon(s):  Nila Dover MD    Procedure: Procedure(s):  XEN GEL STENT - RIGHT EYE, SUB TENON'S     Allergies   Allergen Reactions    Diphenhydramine Other (See Comments)     Hard to wake up    Doxepin Other (See Comments)     Pt is very sensitive, to this medication, sleeps for days     Patient Active Problem List   Diagnosis    Sensorineural hearing loss (SNHL) of both ears    Disorders of smell    Allergic sinusitis     Past Medical History:   Diagnosis Date    Chronic glaucoma     Eczema     Hypertension     Psoriasis     Sleep apnea     had surgery no cpap     Past Surgical History:   Procedure Laterality Date    BACK SURGERY      COLONOSCOPY      EYE SURGERY Left 2020    XEN GEL IMPLANT performed by Nila oDver MD at [facility]       Social History     Tobacco Use    Smoking status: Never Smoker    Smokeless tobacco: Never Used   Vaping Use    Vaping Use: Never used   Substance Use Topics    Alcohol use: Yes    Drug use: Yes     Types: Marijuana Rodena Capes)     Comment: medical for glaucoma      Medications  No current facility-administered medications on file prior to encounter. Current Outpatient Medications on File Prior to Encounter   Medication Sig Dispense Refill    amLODIPine (NORVASC) 10 MG tablet Take 10 mg by mouth daily      Dupilumab (DUPIXENT) 200 MG/1. 14ML SOSY injection Inject 200 mg into the skin once       Current Facility-Administered Medications   Medication Dose Route Frequency Provider Last Rate Last Admin    sodium chloride flush 0.9 % injection 5-40 mL  5-40 mL IntraVENous 2 times per day Bran Peralta MD        sodium chloride flush 0.9 % injection 5-40 mL  5-40 mL IntraVENous MADISON Nassar MD        0.9 % sodium chloride infusion   IntraVENous MADISON Nassar MD         Vital Signs (Current)   Vitals:    22 1659 22 1156   BP:  135/83   Pulse:  72   Resp:  20   Temp:  97.3 °F (36.3 °C)   TempSrc:  Temporal   SpO2:  97%   Weight: 225 lb (102.1 kg) 240 lb (108.9 kg)   Height: 6' 1\" (1.854 m) 6' 1\" (1.854 m)                                          BP Readings from Last 3 Encounters:   22 135/83   20 117/87   20 (!) 145/85     Vital Signs Statistics (for past 48 hrs)     Temp  Av.3 °F (36.3 °C)  Min: 97.3 °F (36.3 °C)   Min taken time: 22 1156  Max: 97.3 °F (36.3 °C)   Max taken time: 22 1156  Pulse  Av  Min: 72   Min taken time: 22 1156  Max: 67   Max taken time: 22 1156  Resp  Av  Min: 20   Min taken time: 22 1156  Max: 20   Max taken time: 22 1156  BP  Min: 135/83   Min taken time: 22 1156  Max: 135/83   Max taken time: 22 1156  SpO2  Av %  Min: 97 %   Min taken time: 22 1156  Max: 97 %   Max taken time: 22 1156  BP Readings from Last 3 Encounters:   22 135/83   20 117/87   20 (!) 145/85       BMI  Body mass index is 31.66 kg/m². Estimated body mass index is 31.66 kg/m² as calculated from the following:    Height as of this encounter: 6' 1\" (1.854 m). Weight as of this encounter: 240 lb (108.9 kg). CBC No results found for: WBC, RBC, HGB, HCT, MCV, RDW, PLT  CMP  No results found for: NA, K, CL, CO2, BUN, CREATININE, GFRAA, AGRATIO, LABGLOM, GLUCOSE, GLU, PROT, CALCIUM, BILITOT, ALKPHOS, AST, ALT  BMP  No results found for: NA, K, CL, CO2, BUN, CREATININE, CALCIUM, GFRAA, LABGLOM, GLUCOSE, GLU  POCGlucose  No results for input(s): GLUCOSE in the last 72 hours.    Coags  No results found for: PROTIME, INR, APTT  HCG (If Applicable) No results found for: PREGTESTUR, PREGSERUM, HCG, HCGQUANT   ABGs No results found for: PHART, PO2ART, DHC0AFJ, YZW4BAT, BEART, Z5SLCWCS   Type & Screen (If Applicable)  No results found for: LABABO, LABRH                         BMI: Wt Readings from Last 3 Encounters:       NPO Status:  >8h                          Anesthesia Evaluation   no history of anesthetic complications (sensitive to sedatives): Airway: Mallampati: Unable to assess / NA  TM distance: >3 FB   Neck ROM: full  Comment: Large mouth  s/p uvulectomy  Mouth opening: > = 3 FB   Dental: normal exam         Pulmonary:       (-) COPD, asthma, sleep apnea (resolved following surgical procedure), rhonchi, wheezes and rales  Smoker: marijuana for glaucoma. ROS comment: Dupixent   Cardiovascular:  Exercise tolerance: good (>4 METS),   (+) hypertension:, hyperlipidemia    (-) past MI,  angina, weak pulses and peripheral edema      Rhythm: regular  Rate: normal                    Neuro/Psych:      (-) seizures, TIA and CVA           GI/Hepatic/Renal:        (-) GERD, liver disease, no renal disease and no morbid obesity       Endo/Other:        (-) diabetes mellitus, hypothyroidism               Abdominal:         (-) obese Abdomen: soft. Vascular:     - DVT and PE. Other Findings:             Anesthesia Plan      MAC     ASA 2     (NPO appropriate; denies active nausea / reflux. Tolerated procedure on left eye with 2mg midazolam + 100mcg fentanyl. Plan propofol with Sub Tenon's planned per Dr. Meka Dubose.  )        Anesthetic plan and risks discussed with patient. Plan discussed with CRNA. This pre-anesthesia assessment may be used as a history and physical.    DOS STAFF ADDENDUM:    Pt seen and examined, chart reviewed (including anesthesia, drug and allergy history). No interval changes to history and physical examination. Anesthetic plan, risks, benefits, alternatives, and personnel involved discussed with patient. Patient verbalized an understanding and agrees to proceed.       Bettina Altamirano MD  May 23, 2022  12:01

## 2022-05-23 NOTE — ANESTHESIA POSTPROCEDURE EVALUATION
Department of Anesthesiology  Postprocedure Note    Patient: Julio C Carmona  MRN: [id number]  YOB: 1960  Date of evaluation: 5/23/2022  Time:  2:48 PM     Procedure Summary     Date: 05/23/22 Room / Location: [address]    Anesthesia Start: 1229 Anesthesia Stop: 1139    Procedure: XEN GEL STENT - RIGHT EYE, SUB TENON'S (Right Eye) Diagnosis:       Primary open angle glaucoma of right eye, severe stage      (Primary open angle glaucoma of right eye, severe stage)    Surgeons: Cayla Dalton MD Responsible Provider: Jessica Williamson MD    Anesthesia Type: MAC ASA Status: 2          Anesthesia Type: MAC    Kimberly Phase I: Kimberly Score: 10    Kimberly Phase II: Kimberly Score: 10    Last vitals: Reviewed and per EMR flowsheets. Anesthesia Post Evaluation    Patient location during evaluation: PACU  Patient participation: complete - patient participated  Level of consciousness: awake and alert  Airway patency: patent  Nausea & Vomiting: no nausea and no vomiting  Complications: no  Cardiovascular status: hemodynamically stable and blood pressure returned to baseline  Respiratory status: spontaneous ventilation, nonlabored ventilation and room air  Hydration status: stable  Comments: Mr. Chancy Bumpers was seen resting comfortably following procedure. Appropriate for discharge home with .        Jessica Williamson MD

## (undated) DEVICE — OCCUCOAT SYRINGE 1ML 6PK: Brand: OCCUCOAT

## (undated) DEVICE — GLOVE SURG SZ 75 CRM LTX FREE POLYISOPRENE POLYMER BEAD ANTI

## (undated) DEVICE — VISCOAT OPTH SOLN 0.5ML VISCOELASTIC

## (undated) DEVICE — EXPLORER- FRAME MATERIAL: STAINLESS STEEL- TRILENIUM POLARIZED LENS- SUNGLASSES: Brand: EAGLE EYES

## (undated) DEVICE — MICROSURGICAL INSTRUMENT ANTERIOR CHAMBER CANNULA 30GA: Brand: ALCON

## (undated) DEVICE — COVER,MAYO STAND,STERILE: Brand: MEDLINE

## (undated) DEVICE — SYRINGE MED 5ML STD CLR PLAS N CTRL SLIP TIP DISP

## (undated) DEVICE — Z DISCONTINUED BY MEDLINE USE 2570500 SHIELD EYE CORNEAL STRL

## (undated) DEVICE — SYRINGE TB 1ML NDL 25GA L0.625IN PLAS SLIP TIP CONVENTIONAL

## (undated) DEVICE — Z DISCONTINUED USE 2131664 WIPE INSTR W3XL3IN NONLINTING

## (undated) DEVICE — NEEDLE HYPO 30GA L0.5IN BGE POLYPR HUB S STL REG BVL STR

## (undated) DEVICE — CAUTERY ELECSURG BLNT TIP 18 GA TAPR

## (undated) DEVICE — Z INACTIVE USE 2735373 APPLICATOR FBR LAIN COT WOOD TIP ECONOMICAL

## (undated) DEVICE — DRAPE SURG LG DISCOVISC

## (undated) DEVICE — NEEDLE HYPO 25GA L0.625IN BLU POLYPR HUB S STL REG BVL STR

## (undated) DEVICE — SUTURE VCRL SZ 8-0 L5IN ABSRB VLT L5MM BV130-4 3/8 CIR J405G

## (undated) DEVICE — EYE PAK* 1041 PLASTIC OPHTHALMIC DRAPE APERTURE POUCH: Brand: ALCON EYE-PAK

## (undated) DEVICE — SOLUTION IRRIGATION BAL SALT SOLUTION 15 ML STRL BSS

## (undated) DEVICE — SYRINGE TB 1ML TRNSLUC BRL WHT PLUNG BLK MRK CONVENTIONAL

## (undated) DEVICE — Z INACTIVE OBSOLETE PER MEDTRONIC SHIELD EYE CORNEAL L15MM OD5MM POLYPR CELOS

## (undated) DEVICE — Z DISCONTINUED NO SUB IDED CANNULA OPHTH SUB TENON 19 GAX1 IN 1.1X25 MM FLATTENED TIP

## (undated) DEVICE — SPONGE GZ W4XL4IN COT 12 PLY TYP VII WVN C FLD DSGN

## (undated) DEVICE — SOLUTION IV IRRIG WATER 500ML POUR BRL ST 2F7113

## (undated) DEVICE — Device

## (undated) DEVICE — 3 ML SYRINGE LUER-LOCK TIP: Brand: MONOJECT

## (undated) DEVICE — SUTURE SILK PERMAHAND TG140-8 X 2 MICROPOINT SPAT 3/8 CIR 1732G